# Patient Record
Sex: MALE | Race: BLACK OR AFRICAN AMERICAN
[De-identification: names, ages, dates, MRNs, and addresses within clinical notes are randomized per-mention and may not be internally consistent; named-entity substitution may affect disease eponyms.]

---

## 2020-05-29 ENCOUNTER — HOSPITAL ENCOUNTER (OUTPATIENT)
Dept: HOSPITAL 62 - RAD | Age: 60
End: 2020-05-29
Attending: ORTHOPAEDIC SURGERY
Payer: OTHER GOVERNMENT

## 2020-05-29 DIAGNOSIS — M66.822: Primary | ICD-10-CM

## 2020-05-29 NOTE — RADIOLOGY REPORT (SQ)
EXAM DESCRIPTION:  MRI LT UPPER JOINT WITHOUT



IMAGES COMPLETED DATE/TIME:  5/29/2020 11:36 am



REASON FOR STUDY:  M66.829 SPONTANEOUS RUPTURE OF OTHER TENDONS, UNSPECIFIED UPPER ARM, M66.82 M66.82
2  SPONTANEOUS RUPTURE OF OTHER TENDONS, LEFT UPPER ARM M66.829  SPONTANEOUS RUPTURE OF OTHER TENDONS
, UNSPECIFIED UP



COMPARISON:  None.



TECHNIQUE:  Non arthrogram non contrasted MRI left elbow images acquired and stored on PACS. Multipla
amarjit images to include fat sensitive sequences as T1, fluid sensitive sequences as T2/STIR, cartilage 
sensitive sequences as FSPD, and gradient echo sequences.



LIMITATIONS:  Mild motion artifact



FINDINGS:  Acute tear, distal triceps tendon at the olecranon attachment.  There is a gap in the tend
on 2.5 cm on sagittal image 13.  No underlying olecranon fracture.  There is diffuse hemorrhage and e
radha at the musculotendinous junction of the triceps distally, best shown on axial images 5 through 1
3.

BONE MARROW: No alteration of signal to suggest marrow replacement or edema. No occult fracture. No l
arge osteophytes.

JOINT EFFUSION: Small joint effusion.  No loose bodies.

ARTICULAR SURFACES: Normal.

MEDIAL COLLATERAL LIGAMENT COMPLEX: Intact without edema or tear.

MEDIAL EPICONDYLE AND COMMON FLEXOR TENDON: No tendinopathy.  No partial or full-thickness tear.

LATERAL COLLATERAL LIGAMENT: Intact without edema or tear.

LATERAL EPICONDYLE AND COMMON EXTENSOR TENDON: Proximal tendinopathy with increased signal on coronal
 images 13-15.  No full-thickness tear.  Lateral epicondyle intact, no fracture or marrow edema.

LATERAL ULNAR COLLATERAL LIGAMENT: Grossly intact

BICEPS TENDON: Intact. No partial or full-thickness tendon tear. No muscle edema.

TRICEPS TENDON: Acute full thickness tear

ULNAR NERVE: Well-visualized without edema or encroachment.

ADJACENT SOFT TISSUES: Edema in the distal triceps at the musculotendinous junction

OTHER: No other significant finding.



IMPRESSION:  Acute rupture distal triceps tendon.  No olecranon fracture



TECHNICAL DOCUMENTATION:  JOB ID:  [id number]

 2011 [employer]- All Rights Reserved



Reading location - IP/workstation name: [facility]

## 2020-06-02 ENCOUNTER — HOSPITAL ENCOUNTER (OUTPATIENT)
Dept: HOSPITAL 62 - OROUT | Age: 60
Discharge: HOME | End: 2020-06-02
Attending: ORTHOPAEDIC SURGERY
Payer: OTHER GOVERNMENT

## 2020-06-02 VITALS — SYSTOLIC BLOOD PRESSURE: 124 MMHG | DIASTOLIC BLOOD PRESSURE: 88 MMHG

## 2020-06-02 DIAGNOSIS — Z03.818: ICD-10-CM
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
## 2020-06-02 DIAGNOSIS — Z87.891: ICD-10-CM

## 2020-06-02 DIAGNOSIS — S52.025A: ICD-10-CM

## 2020-06-02 DIAGNOSIS — S46.312A: Primary | ICD-10-CM

## 2020-06-02 DIAGNOSIS — Z79.899: ICD-10-CM

## 2020-06-02 DIAGNOSIS — Z88.0: ICD-10-CM

## 2020-06-02 LAB
ANION GAP SERPL CALC-SCNC: 6 MMOL/L (ref 5–19)
BUN SERPL-MCNC: 18 MG/DL (ref 7–20)
CALCIUM: 9.5 MG/DL (ref 8.4–10.2)
CHLORIDE SERPL-SCNC: 105 MMOL/L (ref 98–107)
CO2 SERPL-SCNC: 27 MMOL/L (ref 22–30)
ERYTHROCYTE [DISTWIDTH] IN BLOOD BY AUTOMATED COUNT: 13.7 % (ref 11.5–14)
GLUCOSE SERPL-MCNC: 113 MG/DL (ref 75–110)
HCT VFR BLD CALC: 45.4 % (ref 37.9–51)
HGB BLD-MCNC: 15.2 G/DL (ref 13.5–17)
MCH RBC QN AUTO: 29.1 PG (ref 27–33.4)
MCHC RBC AUTO-ENTMCNC: 33.4 G/DL (ref 32–36)
MCV RBC AUTO: 87 FL (ref 80–97)
PLATELET # BLD: 225 10^3/UL (ref 150–450)
POTASSIUM SERPL-SCNC: 4.5 MMOL/L (ref 3.6–5)
RBC # BLD AUTO: 5.22 10^6/UL (ref 4.35–5.55)
WBC # BLD AUTO: 5.2 10^3/UL (ref 4–10.5)

## 2020-06-02 PROCEDURE — 73070 X-RAY EXAM OF ELBOW: CPT

## 2020-06-02 PROCEDURE — C1713 ANCHOR/SCREW BN/BN,TIS/BN: HCPCS

## 2020-06-02 PROCEDURE — 93010 ELECTROCARDIOGRAM REPORT: CPT

## 2020-06-02 PROCEDURE — 80048 BASIC METABOLIC PNL TOTAL CA: CPT

## 2020-06-02 PROCEDURE — 01710 ANES PX NRV MUSC UPR A&E NOS: CPT

## 2020-06-02 PROCEDURE — 85027 COMPLETE CBC AUTOMATED: CPT

## 2020-06-02 PROCEDURE — 87635 SARS-COV-2 COVID-19 AMP PRB: CPT

## 2020-06-02 PROCEDURE — C9803 HOPD COVID-19 SPEC COLLECT: HCPCS

## 2020-06-02 PROCEDURE — 24341 RPR TDN/MUSC UPR A/E EACH: CPT

## 2020-06-02 PROCEDURE — 36415 COLL VENOUS BLD VENIPUNCTURE: CPT

## 2020-06-02 PROCEDURE — 93005 ELECTROCARDIOGRAM TRACING: CPT

## 2020-06-02 PROCEDURE — 71045 X-RAY EXAM CHEST 1 VIEW: CPT

## 2020-06-02 NOTE — OPERATIVE REPORT
Operative Report


DATE OF SURGERY: 06/02/20


PREOPERATIVE DIAGNOSIS: Left triceps tendon rupture


POSTOPERATIVE DIAGNOSIS: Same plus nondisplaced extra-articular olecranon 

fracture


OPERATION: Left triceps tendon repair


SURGEON: FRANCISCA MARIN


ANESTHESIA: GA


COMPLICATIONS: 





None


ESTIMATED BLOOD LOSS: Minimal


PROCEDURE: 





Indication for above procedure:





60-year-old male who sustained a fall onto his left elbow after which he had 

notable swelling and pain with attempted motion of his elbow.  Patient prior to 

the fall did have antecedent pain.  Subsequently MRI was ordered confirming 

diagnosis of triceps tendon tear.  Treatment options were discussed decision was

made to proceed with operative intervention.





Procedure In Detail:





Patient was seen and evaluated in the preoperative holding area.  The LEFT upper

extremity was initialized and marked.  Patient received 2g of Ancef IV for 

bacterial prophylaxis.  Patient was taken back to the operative room where 

transferred to the operative table and placed under general anesthesia.  A 

surgical team debriefing was performed ensuring all instrumentation was 

available, the surgical procedure was discussed with possible concerns reviewed.

 The upper extremity was prepped with ChloraPrep and draped in a sterile 

fashion.  A sterile tourniquet was placed around the left upper extremity.  A 

timeout was done identifying correct patient, procedure and extremity everyone 

in attendance agree with this and verbalized no concerns. The extremity was 

exsanguinated the tourniquet was inflated to 250 mmHg.





Longitudinal skin incision was utilized.  Blunt dissection was performed.  Any 

peripheral veins were coagulated with bipolar cautery.  Full-thickness triceps 

tendon rupture was noted and retracted with a small piece of olecranon tip.  

There is a small nondisplaced fracture along the ulnar border of the olecranon 

which did not extend into the articular surface it was just superficially.  Wou

nd was copiously irrigated with normal saline.  Distal triceps was debrided 

along with the insertion point.





A running Krakw suture was made from distal to proximal and proximal to distal.

 Drill was then utilized to place  hole within the olecranon under C arm 

fluoroscopy which is approximately 15 mm from the olecranon tip.  A second 

hole was placed laterally under C arm fluoroscopy guidance.  A Arthrex swivel 

lock loaded with fiber tape was placed within each hole respectively.  

Horizontal mattress sutures were placed within the triceps approximately 2 cm 

proximal and then the fiber tape were passed proximal to the mattress sutures.  

While maintaining elbow at 45 degrees of extension the mattress sutures were 

tied to provide initial fixation along the proximal row.





Under C arm guidance 2 additional  holes medial and lateral distally under 

C arm fluoroscopy to ensure were placed anchors would not be placed within the 

joint.  I then shuttled 1 FiberWire, limb of the Krakw, limb of the horizontal 

mattress from the ulnar and radial side respectively.  This was then loaded on 

the swivel lock anchor while my assistant maintained at 45 degrees of flexion 

this was secured.  This was then repeated along the ulnar side with appropriate 

tension this adequately brought the triceps down to its original insertion point

with additional compression with the lateral row.  All sutures were cut.  Elbow 

was able to be flexed to 75 degrees of without excessive tension.  Final C arm 

fluoroscopy demonstrated no propagation of fracture nor propagation under direct

visualization.





Wound was copiously irrigated with normal saline.  Defect along the lateral 

triceps was closed with interrupted 0 Vicryl suture.  Subcutaneous tissues were 

closed with interrupted 3-0 Monocryl suture.  Skin was closed with staples.  

Incision was injected with 30 cc of 0.5% bupivacaine without epinephrine.  Wound

was dressed with Acticoat OpSite.  Patient was placed in a dorsal and volar 

splint maintaining 60 degrees of elbow flexion.  Tourniquet was deflated patient

normal peripheral perfusion.





Sponge counts, instrument counts, needle counts were correct.  Patient was then 

awoken from anesthesia.  Transferred from the operating room table to the 

operating room stretcher.  There was no intraoperative complications patient 

tolerated procedure well stable to PACU.





Postop plan:





Patient will follow in the office in 2 weeks at which point we will transition 

to a hinged elbow brace at 75 degrees of flexion.  With increased flexion to 90 

degrees at  5 weeks postoperatively increasing 20 degrees/week until full range 

of motion was achieved.  Once full range of motion is achieved we will begin 

active range of motion of the biceps with light resistance at the 8-week 

protocol.  Will begin gradual weightbearing and strengthening of the triceps at 

approximately 10 weeks.

## 2020-06-02 NOTE — DISCHARGE SUMMARY
Discharge Summary (SDC)





- Discharge


Final Diagnosis: 





Left Triceps Rupture


Date of Surgery: 06/02/20


Discharge Date: 06/02/20


Condition: Good


Treatment or Instructions: 








Schedule Follow Up w/ Dr. Allan Ascencio @ [facility] to be seen 

in 10-14 days or as scheduled


[place]: [phone] 


[place]: [phone]


[place]: [phone] 





Ice and elevate





Keep splint clean/dry/intact, do not remove.





If your fingers become numb please unwrap the Ace wrap but leave the splint in 

place, if the sensation does not return within 30 minutes please return to the 

emergency department.





May begin finger range of motion attempting to make full fist.





Please use ibuprofen (Motrin or Advil) 600-800 mg every 8 hours as needed for 

pain or fever DO NOT TAKE w/ TORADOL may use once TORADOL complete.  You may 

also use acetaminophen (Tylenol) 1000 mg every 4-6 hours as needed for pain or 

fever.  Please be aware that many medications contain acetaminophen, do not 

exceed a total of 1000 mg of acetaminophen every 6 hours.  





If ibuprofen and acetaminophen are not sufficient for your pain you may take the

Percocet/Norco.  Please be aware that the Percocet/Norco does contain Tylenol.





Stool softener of choice when on pain medication.





USE OF OVER-THE-COUNTER IBUPROFEN:


     Ibuprofen (Advil, Nuprin, Medipren, Motrin IB) is a medication for fever 

and pain control.  In addition, it has anti- inflammatory effects which may be 

beneficial, especially in the treatment of injuries.


     It's best to take ibuprofen with food.  Persons with ulcer disease or 

allergy to aspirin should notify their physician of this before taking 

ibuprofen.


     Ibuprofen can be given every four to six hours, for a total of four doses 

daily.


     Age              Pain or fever dose          Antiinflammatory dose


     6-8 yr              200 mg (1 tab)                200 mg (1 tab)


     9-11 yr             200 mg (1 tab)                200-400 mg (1-2 tab)


     11-14 yr            200-400 mg (1-2 tab)         400 mg (2 tab)


     15-adult            400 mg (2 tab)                600 mg (3 tab)








ORAL NARCOTIC MEDICATION:


     You have been given a prescription for pain control.  This medication is a 

narcotic.  It's best taken with food, as nausea can result if taken on an empty 

stomach.


     Don't operate machinery or drive within six hours of taking this 

medication.  Do not combine this medicine with alcohol, or with any medication 

which can cause sedation (such as cold tablets or sleeping pills) unless you get

permission from the physician.


     Narcotics tend to cause constipation.  If possible, drink plenty of fluids 

and eat a diet high in fiber and fruits.





     Please be aware that prescription narcotics also have the potential for 

abuse.  People become addicted to these medications because of the general sense

of wellbeing that they induce.  This feeling along with a significant reduction 

in tension, anxiety, and aggression provides a stimulating seductive quality to 

these drugs.  Once your pain is under control, we encourage you to discard your 

unused narcotics.





Prescriptions: 


Ketorolac Tromethamine [Toradol 10 mg Tablet] 10 mg PO Q8HP PRN #12 tablet


 PRN Reason: 


Oxycodone HCl/Acetaminophen [Percocet 7.5-325 mg Tablet] 1 each PO Q6 PRN #25 

tablet


 PRN Reason: 


Referrals: 


HANG BONILLA FNP [Primary Care Provider] - 


Respiratory Treatments at Home: Deep Breathing/Coughing, Incentive Spirometer


Discharge Activity: No Lifting Over 10 Pounds, No Lifting/Push/Pulling


Report the Following to Your Physician Immediately: Fever over 101 Degrees, 

Unusual Bleeding, Redness, Swelling, Warmth, Increased Soreness

## 2020-06-02 NOTE — RADIOLOGY REPORT (SQ)
EXAM DESCRIPTION:  CHEST SINGLE VIEW



IMAGES COMPLETED DATE/TIME:  6/2/2020 11:00 am



REASON FOR STUDY:  pre op



COMPARISON:  None.



EXAM PARAMETERS:  NUMBER OF VIEWS: One view.

TECHNIQUE: Single frontal radiographic view of the chest acquired.

RADIATION DOSE: NA

LIMITATIONS: None.



FINDINGS:  LUNGS AND PLEURA: No opacities, masses or pneumothorax. No pleural effusion.

MEDIASTINUM AND HILAR STRUCTURES: No masses.  Contour normal.

HEART AND VASCULAR STRUCTURES: Heart normal in size.  Normal vasculature.

BONES: No acute findings.

HARDWARE: Sternotomy wires.

OTHER: No other significant finding.



IMPRESSION:  NO ACUTE RADIOGRAPHIC FINDING IN THE CHEST.



TECHNICAL DOCUMENTATION:  JOB ID:  [id number]

 2011 [employer]- All Rights Reserved



Reading location - IP/workstation name: [email or web address]

## 2020-06-02 NOTE — RADIOLOGY REPORT (SQ)
EXAM DESCRIPTION:  NO CHG FLUORO; ELBOW LEFT AP/LATERAL



IMAGES COMPLETED DATE/TIME:  6/2/2020 4:42 pm; 6/2/2020 5:03 pm



REASON FOR STUDY:  LEFT TRICEP REPAIR



COMPARISON:  None.



FLUOROSCOPY TIME:  19 seconds

5 Images saved to PACS



LIMITATIONS:  None.



PROCEDURE:  Triceps repair



FINDINGS:  Images in fluoro document the procedure.



IMPRESSION:  Triceps repair.  Refer to operative note for further information.



COMMENT:  PQRS [id number]:  Fluoroscopy time of the procedure is documented in the report.



TECHNICAL DOCUMENTATION:  JOB ID:  [id number]

 2011 [employer]- All Rights Reserved



Reading location - IP/workstation name: [email or web address]

## 2020-06-02 NOTE — RADIOLOGY REPORT (SQ)
EXAM DESCRIPTION:  NO CHG FLUORO; ELBOW LEFT AP/LATERAL



IMAGES COMPLETED DATE/TIME:  6/2/2020 4:42 pm; 6/2/2020 5:03 pm



REASON FOR STUDY:  LEFT TRICEP REPAIR



COMPARISON:  None.



FLUOROSCOPY TIME:  19 seconds

5 Images saved to PACS



LIMITATIONS:  None.



PROCEDURE:  Triceps repair



FINDINGS:  Images in fluoro document the procedure.



IMPRESSION:  Triceps repair.  Refer to operative note for further information.



COMMENT:  PQRS [id number]:  Fluoroscopy time of the procedure is documented in the report.



TECHNICAL DOCUMENTATION:  JOB ID:  [id number]

 2011 [employer]- All Rights Reserved



Reading location - IP/workstation name: [email or web address]